# Patient Record
Sex: MALE | Race: WHITE | NOT HISPANIC OR LATINO | URBAN - METROPOLITAN AREA
[De-identification: names, ages, dates, MRNs, and addresses within clinical notes are randomized per-mention and may not be internally consistent; named-entity substitution may affect disease eponyms.]

---

## 2022-03-24 NOTE — PATIENT DISCUSSION
Per mod CDs OU. IOP 14/14. (-)Fam hx. OCT RNFL/HVF (03/17/2022) wnl OD/OS. No treatment necessary at this time.

## 2022-04-15 NOTE — PATIENT DISCUSSION
CATARACT SURGERY PLANNER - STANDARD IOL/+FEMTO: Phacoemulsification with IOL: Eye: OD|DOS: 4/21/2022|Model: DIBOO|Power: 21. 5|Target: [facility]|Femto: YES|Arcs: 30° @ 0° |Visc: DUET|Omidria: YES|10% Phenylephrine: YES|Epi-shugarcaine: YES|Phaco Setting: STD|Notes: PLAN: Gena Stern @ [facility]. HX: GL SUSP, LOW RISK OU NO GTT TX; NO MAC PATH. OU. DILATED PUPIL: 7MM.

## 2022-04-15 NOTE — PATIENT DISCUSSION
Visually significant: Patient already scheduled for surgery on 5/5/2022 @[facility].

## 2022-04-21 NOTE — PATIENT DISCUSSION
Visually significant: Patient already scheduled for surgery on 5/5/2022 @[facility].

## 2022-04-21 NOTE — PATIENT DISCUSSION
Visually significant: Patient already scheduled for surgery on 5/5/2022 @[facility].

## 2022-04-26 NOTE — PATIENT DISCUSSION
CATARACT SURGERY PLANNER - STANDARD IOL/+FEMTO: Phacoemulsification with IOL: Eye: OS|DOS: 5/5/2022|Model: DIBOO|Power: 22. 0|Target: PLANO|Femto: YES|Arcs: 28 @ 0 ; 28 @ 180|Visc: DUET|Omidria: YES|10% Phenylephrine: YES|Epi-shugarcaine: YES|Phaco Setting: YES|Notes: PLAN: Alline Alysha @ [facility]. HX: GL SUSP LOW RISK. DILATED PUPIL: 7MM.

## 2022-04-26 NOTE — PATIENT DISCUSSION
Visually significant: Patient already scheduled for surgery on 5/5/2022 @[facility].

## 2022-05-05 NOTE — PATIENT DISCUSSION
Visually significant: Patient already scheduled for surgery on 5/5/2022 @[facility].

## 2022-06-20 NOTE — PATIENT DISCUSSION
Per mod CDs OU. IOP 11/12. (-)Fam hx. OCT RNFL/HVF (03/17/2022) wnl OD/OS. No treatment necessary at this time.

## 2023-04-10 ENCOUNTER — PRE-OP/H&P (OUTPATIENT)
Dept: URBAN - METROPOLITAN AREA CLINIC 4 | Facility: CLINIC | Age: 73
End: 2023-04-10

## 2023-04-10 VITALS — HEIGHT: 60 IN | SYSTOLIC BLOOD PRESSURE: 134 MMHG | DIASTOLIC BLOOD PRESSURE: 90 MMHG | HEART RATE: 88 BPM

## 2023-04-10 DIAGNOSIS — H25.13: ICD-10-CM

## 2023-04-10 PROCEDURE — 92136 OPHTHALMIC BIOMETRY: CPT

## 2023-04-10 PROCEDURE — 99211PRE PRE OP VISIT

## 2023-04-25 PROBLEM — Z96.1: Noted: 2023-04-25

## 2023-04-26 ENCOUNTER — POST-OP (OUTPATIENT)
Dept: URBAN - METROPOLITAN AREA CLINIC 4 | Facility: CLINIC | Age: 73
End: 2023-04-26

## 2023-04-26 DIAGNOSIS — Z96.1: ICD-10-CM

## 2023-04-26 PROCEDURE — 99024NOCM NON COMANAGED POST OP CARE

## 2023-04-26 ASSESSMENT — TONOMETRY: OD_IOP_MMHG: 20

## 2023-04-26 ASSESSMENT — VISUAL ACUITY: OD_SC: 20/20-1

## 2023-05-10 ENCOUNTER — POST OP/EVAL OF SECOND EYE (OUTPATIENT)
Dept: URBAN - METROPOLITAN AREA CLINIC 4 | Facility: CLINIC | Age: 73
End: 2023-05-10

## 2023-05-10 DIAGNOSIS — H25.12: ICD-10-CM

## 2023-05-10 DIAGNOSIS — Z96.1: ICD-10-CM

## 2023-05-10 PROCEDURE — 99024 POSTOP FOLLOW-UP VISIT: CPT

## 2023-05-10 PROCEDURE — 92136 OPHTHALMIC BIOMETRY: CPT

## 2023-05-10 ASSESSMENT — TONOMETRY
OD_IOP_MMHG: 15
OS_IOP_MMHG: 13

## 2023-05-10 ASSESSMENT — KERATOMETRY
OD_K1POWER_DIOPTERS: 41.50
OD_K2POWER_DIOPTERS: 42.50
OD_AXISANGLE2_DEGREES: 112
OD_AXISANGLE_DEGREES: 22

## 2023-05-10 ASSESSMENT — VISUAL ACUITY
OS_SC: 20/60
OU_SC: 20/25
OD_SC: 20/25-2

## 2023-05-31 ENCOUNTER — POST-OP (OUTPATIENT)
Dept: URBAN - METROPOLITAN AREA CLINIC 4 | Facility: CLINIC | Age: 73
End: 2023-05-31

## 2023-05-31 DIAGNOSIS — Z96.1: ICD-10-CM

## 2023-05-31 PROCEDURE — 99024NOCM NON COMANAGED POST OP CARE

## 2023-05-31 ASSESSMENT — KERATOMETRY
OD_AXISANGLE2_DEGREES: 112
OD_K1POWER_DIOPTERS: 41.50
OD_AXISANGLE_DEGREES: 22
OD_K2POWER_DIOPTERS: 42.50

## 2023-05-31 ASSESSMENT — TONOMETRY: OS_IOP_MMHG: 14

## 2023-05-31 ASSESSMENT — VISUAL ACUITY: OS_SC: 20/30-1

## 2023-06-07 ENCOUNTER — POST-OP (OUTPATIENT)
Dept: URBAN - METROPOLITAN AREA CLINIC 4 | Facility: CLINIC | Age: 73
End: 2023-06-07

## 2023-06-07 DIAGNOSIS — Z96.1: ICD-10-CM

## 2023-06-07 PROCEDURE — 99024NOCM NON COMANAGED POST OP CARE

## 2023-06-07 ASSESSMENT — KERATOMETRY
OD_AXISANGLE2_DEGREES: 112
OD_K1POWER_DIOPTERS: 41.50
OD_K2POWER_DIOPTERS: 42.50
OD_AXISANGLE_DEGREES: 22

## 2023-06-07 ASSESSMENT — VISUAL ACUITY
OD_SC: 20/25-2
OS_SC: 20/25-2
OU_SC: 20/20-2

## 2023-06-07 ASSESSMENT — TONOMETRY
OS_IOP_MMHG: 10
OD_IOP_MMHG: 10

## 2024-10-07 ENCOUNTER — COMPREHENSIVE EXAM (OUTPATIENT)
Facility: LOCATION | Age: 74
End: 2024-10-07

## 2024-10-07 DIAGNOSIS — H04.123: ICD-10-CM

## 2024-10-07 DIAGNOSIS — E11.9: ICD-10-CM

## 2024-10-07 PROCEDURE — 92014 COMPRE OPH EXAM EST PT 1/>: CPT
